# Patient Record
(demographics unavailable — no encounter records)

---

## 2024-10-17 NOTE — ASSESSMENT
[FreeTextEntry1] : 62F with hx AVR (done [facility]), HTN, HLD, DM, referred by Dr Goins to establish cardiovascular care.  Patient reports occasional chest discomfort for the past few months, with variable duration. self-resolving, not related with exertion.  Patient denies palpitations, no BOLIVAR, no PND, no orthopnea, no leg edema,  no claudication, no syncope.   #Atypical chest pain  intermediate ASCVD risk will obtain CCTA and echocardiogram  #HTN/HLD managed by PCP   RTC post testing  I appreciate the opportunity of working with you in the care of MANFRED CASILLAS . If I may be of additional assistance, please do not hesitate to contact me.

## 2024-10-17 NOTE — HISTORY OF PRESENT ILLNESS
[FreeTextEntry1] : 62F [facility] HTN, HLD, DM, referred by Dr Goins to establish cardiovascular care.  Patient reports occasional chest discomfort for the past few months, with variable duration. self-resolving, not related with exertion.  Patient denies palpitations, no BOLIVAR, no PND, no orthopnea, no leg edema,  no claudication, no syncope.